# Patient Record
Sex: MALE | Race: WHITE | NOT HISPANIC OR LATINO | Employment: UNEMPLOYED | URBAN - METROPOLITAN AREA
[De-identification: names, ages, dates, MRNs, and addresses within clinical notes are randomized per-mention and may not be internally consistent; named-entity substitution may affect disease eponyms.]

---

## 2022-04-22 PROBLEM — I25.10 CAD (CORONARY ARTERY DISEASE), NATIVE CORONARY ARTERY: Status: ACTIVE | Noted: 2022-04-22

## 2022-10-27 NOTE — PROGRESS NOTES
Subjective   History of Present Illness: Sylvester Campos is a 52 y.o. male is being seen for consultation today at the request of Beto Caal MD. In the office today patient complains of right hand weakness and numbness. Patient also complains of pain in his right forearm.  Patient says this has been going on for about a year and a half.  He has noticed pain radiating into his right upper extremity and down to the hand.  He has noticed weakness in his hand as well as numbness of the first second and third digits.  He has also noticed some wasting of the muscles of the hand particularly over the thumb.  He has noticed worsening difficulty using his hand and dropping things.  Patient has no significant left-sided symptoms.  Patient has not undergone any conservative measures for this to this point.  He has undergone an EMG nerve conduction study but does not have the results with him here today.  Apparently he was told that the issue was emanating from his neck.          Previous Treatment: None    The following portions of the patient's history were reviewed and updated as appropriate: allergies, current medications, past family history, past medical history, past social history, past surgical history and problem list.    Review of Systems   Constitutional: Positive for activity change.   HENT: Positive for hearing loss.    Eyes: Negative.    Respiratory: Negative.    Cardiovascular: Negative.    Gastrointestinal: Negative.    Endocrine: Negative.    Genitourinary: Negative.    Musculoskeletal: Positive for neck pain (occasionally).   Skin: Negative.    Neurological: Positive for weakness (right hand) and numbness (right hand).   Hematological: Negative.    Psychiatric/Behavioral: Negative.        Objective     /89   Pulse 98   Wt 71.7 kg (158 lb)   SpO2 99%    There is no height or weight on file to calculate BMI.      Neurologic Exam     Mental Status   Oriented to person, place, and time.      Motor Exam     Strength   Strength 5/5 except as noted.  4 - on the right     Sensory Exam   Decreased sensation over C6 and 7 dermatomes on the right     Gait, Coordination, and Reflexes     Reflexes   Right patellar: 1+  Left patellar: 1+  Right Holden: absent  Left Holden: absent  Right ankle clonus: absent  Left pendular knee jerk: absent      Assessment & Plan   Independent Review of Radiographic Studies:      I personally reviewed and interpreted the images from the following studies.    MRI cervical spine: Multilevel degenerative changes most severe from the C3-7.  At C3-4 there is central disc herniation with some moderate stenosis and the disc herniation abuts the cord.  Smaller central disc herniation at C4-5.  At C5-6 there is there is central disc herniation resulting in moderate to severe central stenosis and severe right foraminal stenosis.  At C6-7 there is mild central stenosis and severe right foraminal stenosis.    Medical Decision Making:      Sylvester Campos is a 52 y.o. male with a history consistent with cervical radiculopathy as well as early myelopathy and central and foraminal stenosis on MRI that does correlate with the patient's C6 and 7 symptoms.  Patient has not tried any conservative measures, and we will send him for epidural steroid injection.  He will also send us his EMG nerve conduction study results.  I will see him back once he completes the injection.  We could consider ACDF for treatment, however he would require a C3-C7 ACDF to treat his central and foraminal stenosis.      Diagnoses and all orders for this visit:    1. Cervical radiculopathy (Primary)    2. Cervical myelopathy (HCC)    3. DDD (degenerative disc disease), cervical      No follow-ups on file.    This patient was examined wearing appropriate personal protective equipment.                      Dr. Hakeem Kirkland IV    10/31/22  10:57 EDT

## 2022-10-31 ENCOUNTER — OFFICE VISIT (OUTPATIENT)
Dept: NEUROSURGERY | Facility: CLINIC | Age: 52
End: 2022-10-31

## 2022-10-31 VITALS
WEIGHT: 158 LBS | DIASTOLIC BLOOD PRESSURE: 89 MMHG | OXYGEN SATURATION: 99 % | SYSTOLIC BLOOD PRESSURE: 171 MMHG | HEART RATE: 98 BPM

## 2022-10-31 DIAGNOSIS — M54.12 CERVICAL RADICULOPATHY: Primary | ICD-10-CM

## 2022-10-31 DIAGNOSIS — G95.9 CERVICAL MYELOPATHY: ICD-10-CM

## 2022-10-31 DIAGNOSIS — M50.30 DDD (DEGENERATIVE DISC DISEASE), CERVICAL: ICD-10-CM

## 2022-10-31 PROCEDURE — 99204 OFFICE O/P NEW MOD 45 MIN: CPT | Performed by: NEUROLOGICAL SURGERY

## 2022-11-22 ENCOUNTER — TELEPHONE (OUTPATIENT)
Dept: NEUROSURGERY | Facility: CLINIC | Age: 52
End: 2022-11-22

## 2022-11-22 NOTE — TELEPHONE ENCOUNTER
Called patient to ask him if he had been able to receive his injection that had been ordered and he said he had not and that he was under the impression that he was getting the injection here at this office on Monday 11/28/22 at his Follow up appointment. I then explained that [facility] a he said no one had contacted him about this. Barbra then called the [facility] and we discovered they had the wrong phone number and was not able to reach him. Barbra gave them the correct number and they will call and schedule him. I called patient back and told him he may want to call them to expedite things. I will Cancel patient for Monday 11/28/22 until he receives his injection.

## 2022-11-22 NOTE — TELEPHONE ENCOUNTER
I called and spoke with [facility] at [facility] to see if they have received paperwork I have faxed. I told Evon the patient told one of our Evon said she did receive on 10-31-22 and put pain management request in on 11-3-22. [facility] tried to reach out to the patient on 11-17-22 and the number didn't work. I provided Evon with an additional number we have.

## 2022-11-22 NOTE — TELEPHONE ENCOUNTER
PATIENT CALLED BACK - ATTEMPTED WT - NOT SUCCESSFUL - PER PATIENT HE NOW  HAS APPOINTMENT SCHEDULED AT [facility] FOR [facility]/22 AT 1PM.  THIS WILL BE HIS INITIAL CONSULT, AFTER HIS VISIT HIS INJECTION WILL BE SCHEDULED.  PLEASE CALL PATIENT WITH ANY QUESTIONS.

## 2025-06-23 ENCOUNTER — OFFICE VISIT (OUTPATIENT)
Dept: NEUROSURGERY | Facility: CLINIC | Age: 55
End: 2025-06-23
Payer: OTHER GOVERNMENT

## 2025-06-23 ENCOUNTER — PREP FOR SURGERY (OUTPATIENT)
Dept: OTHER | Facility: HOSPITAL | Age: 55
End: 2025-06-23
Payer: OTHER GOVERNMENT

## 2025-06-23 VITALS
DIASTOLIC BLOOD PRESSURE: 89 MMHG | BODY MASS INDEX: 25.11 KG/M2 | WEIGHT: 160 LBS | SYSTOLIC BLOOD PRESSURE: 134 MMHG | HEART RATE: 99 BPM | HEIGHT: 67 IN | OXYGEN SATURATION: 94 %

## 2025-06-23 DIAGNOSIS — M54.12 CERVICAL RADICULOPATHY: Primary | ICD-10-CM

## 2025-06-23 DIAGNOSIS — M50.30 DDD (DEGENERATIVE DISC DISEASE), CERVICAL: ICD-10-CM

## 2025-06-23 DIAGNOSIS — M48.02 CERVICAL STENOSIS OF SPINE: ICD-10-CM

## 2025-06-23 PROCEDURE — 99214 OFFICE O/P EST MOD 30 MIN: CPT | Performed by: NEUROLOGICAL SURGERY

## 2025-06-23 NOTE — PROGRESS NOTES
"Subjective   History of Present Illness: Sylvester Campos is a 55 y.o. male is here today for follow-up.  Patient was last seen about 3 years ago.  At that time he was having significant numbness and weakness in his right upper extremity with EMG confirmed C8 radiculopathy.  He now has developed atrophy of his right hand as well as numbness and paresthesias.  Patient denies any significant radiating pain.  No significant neck pain.  He underwent an injection shortly after seeing me initially but has not undergone any further injections.  He has not done any recent physical therapy      Chief Complaint   Patient presents with    Neck Pain          Previous treatment:   Greater than 6 weeks of physical therapy and Home exercise program    Previous neurosurgery:      Previous injections: Cervical epidural    The following portions of the patient's history were reviewed and updated as appropriate: allergies, current medications, past family history, past medical history, past social history, past surgical history, and problem list.    Review of Systems   Constitutional:  Positive for activity change.   HENT: Negative.     Eyes: Negative.    Respiratory: Negative.     Cardiovascular: Negative.    Gastrointestinal: Negative.    Endocrine: Negative.    Genitourinary: Negative.    Musculoskeletal: Negative.    Skin: Negative.    Allergic/Immunologic: Negative.    Neurological:  Positive for weakness (right arm/hand) and numbness (tingling/right arm/hand).   Hematological: Negative.    Psychiatric/Behavioral:  Positive for sleep disturbance.        Objective      /89 (BP Location: Left arm, Patient Position: Sitting)   Pulse 99   Ht 170.2 cm (67\")   Wt 72.6 kg (160 lb)   SpO2 94%   BMI 25.06 kg/m²    Body mass index is 25.06 kg/m².  Vitals:    06/23/25 1519   PainSc: 0-No pain         Neurological Exam  Mental Status  Awake, alert and oriented to person, place and time.    Motor   Strength is 5/5 in all four " extremities except as noted.  Right  weakness 3 out of 5 with atrophy of the intrinsic hand muscles.    Sensory  Decreased sensory loss over right upper extremity.    Reflexes    Right pathological reflexes: Jarvis's absent.  Left pathological reflexes: Jarvis's absent.          Assessment & Plan   Independent Review of Radiographic Studies:      I personally reviewed and interpreted the images from the following studies.    MRI cervical spine: Degenerative changes most severe from C5-T1.  At C5-6 there is moderate to severe central stenosis and bilateral foraminal stenosis right worse than left.  At C6-7 there is bilateral foraminal stenosis that is severe right worse than the left.  At C7-T1 there is severe foraminal stenosis right worse than left.    Medical Decision Making:      Sylvester Campos is a 55 y.o. male was last seen 3 years ago with significant radiculopathy and subsequently has developed worsening symptoms in muscle atrophy.  He has significant central and foraminal stenosis from C5-T1.  I discussed with him that I do not think he will recover any significant muscle function.  Surgery would be to prevent worsening function and also to open up the canal particularly at C5-6 given the central stenosis.  We will proceed with C5-T1 ACDF.  Will get CT scan of the cervical spine for preoperative evaluation of bony stenosis      Diagnoses and all orders for this visit:    1. Cervical radiculopathy (Primary)    2. DDD (degenerative disc disease), cervical    3. Cervical stenosis of spine      No follow-ups on file.    This patient was examined wearing appropriate personal protective equipment.                      Dr. Hakeem Kirkland IV    06/23/25  15:51 EDT

## 2025-07-08 DIAGNOSIS — Z98.1 S/P SPINAL FUSION: Primary | ICD-10-CM

## 2025-07-15 ENCOUNTER — LAB (OUTPATIENT)
Dept: LAB | Facility: HOSPITAL | Age: 55
End: 2025-07-15
Payer: OTHER GOVERNMENT

## 2025-07-15 ENCOUNTER — HOSPITAL ENCOUNTER (OUTPATIENT)
Dept: CARDIOLOGY | Facility: HOSPITAL | Age: 55
Discharge: HOME | End: 2025-07-15
Payer: OTHER GOVERNMENT

## 2025-07-15 DIAGNOSIS — M54.12 CERVICAL RADICULOPATHY: ICD-10-CM

## 2025-07-15 LAB
ABO GROUP BLD: NORMAL
ANION GAP SERPL CALCULATED.3IONS-SCNC: 13 MMOL/L (ref 5–15)
BASOPHILS # BLD AUTO: 0.08 10*3/MM3 (ref 0–0.2)
BASOPHILS NFR BLD AUTO: 1.2 % (ref 0–1.5)
BILIRUB UR QL STRIP: NEGATIVE
BLD GP AB SCN SERPL QL: NEGATIVE
BUN SERPL-MCNC: 10 MG/DL (ref 6–20)
BUN/CREAT SERPL: 9.7 (ref 7–25)
CALCIUM SPEC-SCNC: 9.1 MG/DL (ref 8.6–10.5)
CHLORIDE SERPL-SCNC: 104 MMOL/L (ref 98–107)
CLARITY UR: CLEAR
CO2 SERPL-SCNC: 27 MMOL/L (ref 22–29)
COLOR UR: YELLOW
CREAT SERPL-MCNC: 1.03 MG/DL (ref 0.76–1.27)
DEPRECATED RDW RBC AUTO: 41.4 FL (ref 37–54)
EGFRCR SERPLBLD CKD-EPI 2021: 86.3 ML/MIN/1.73
EOSINOPHIL # BLD AUTO: 0.18 10*3/MM3 (ref 0–0.4)
EOSINOPHIL NFR BLD AUTO: 2.7 % (ref 0.3–6.2)
ERYTHROCYTE [DISTWIDTH] IN BLOOD BY AUTOMATED COUNT: 13.1 % (ref 12.3–15.4)
GLUCOSE SERPL-MCNC: 131 MG/DL (ref 65–99)
GLUCOSE UR STRIP-MCNC: NEGATIVE MG/DL
HBA1C MFR BLD: 6.11 % (ref 4.8–5.6)
HCT VFR BLD AUTO: 47.5 % (ref 37.5–51)
HGB BLD-MCNC: 15.5 G/DL (ref 13–17.7)
HGB UR QL STRIP.AUTO: NEGATIVE
IMM GRANULOCYTES # BLD AUTO: 0.02 10*3/MM3 (ref 0–0.05)
IMM GRANULOCYTES NFR BLD AUTO: 0.3 % (ref 0–0.5)
INR PPP: 1 (ref 0.9–1.1)
KETONES UR QL STRIP: ABNORMAL
LEUKOCYTE ESTERASE UR QL STRIP.AUTO: NEGATIVE
LYMPHOCYTES # BLD AUTO: 2.54 10*3/MM3 (ref 0.7–3.1)
LYMPHOCYTES NFR BLD AUTO: 38.5 % (ref 19.6–45.3)
MCH RBC QN AUTO: 28.5 PG (ref 26.6–33)
MCHC RBC AUTO-ENTMCNC: 32.6 G/DL (ref 31.5–35.7)
MCV RBC AUTO: 87.3 FL (ref 79–97)
MONOCYTES # BLD AUTO: 0.41 10*3/MM3 (ref 0.1–0.9)
MONOCYTES NFR BLD AUTO: 6.2 % (ref 5–12)
MRSA DNA SPEC QL NAA+PROBE: NORMAL
NEUTROPHILS NFR BLD AUTO: 3.36 10*3/MM3 (ref 1.7–7)
NEUTROPHILS NFR BLD AUTO: 51.1 % (ref 42.7–76)
NITRITE UR QL STRIP: NEGATIVE
NRBC BLD AUTO-RTO: 0 /100 WBC (ref 0–0.2)
PH UR STRIP.AUTO: 5.5 [PH] (ref 5–8)
PLATELET # BLD AUTO: 237 10*3/MM3 (ref 140–450)
PMV BLD AUTO: 9.9 FL (ref 6–12)
POTASSIUM SERPL-SCNC: 3.5 MMOL/L (ref 3.5–5.2)
PROT UR QL STRIP: ABNORMAL
PROTHROMBIN TIME: 13.1 SECONDS (ref 11.7–14.2)
QT INTERVAL: 332 MS
QTC INTERVAL: 427 MS
RBC # BLD AUTO: 5.44 10*6/MM3 (ref 4.14–5.8)
RH BLD: POSITIVE
SODIUM SERPL-SCNC: 144 MMOL/L (ref 136–145)
SP GR UR STRIP: >1.03 (ref 1–1.03)
T&S EXPIRATION DATE: NORMAL
UROBILINOGEN UR QL STRIP: ABNORMAL
WBC NRBC COR # BLD AUTO: 6.59 10*3/MM3 (ref 3.4–10.8)

## 2025-07-15 PROCEDURE — 86850 RBC ANTIBODY SCREEN: CPT

## 2025-07-15 PROCEDURE — 86900 BLOOD TYPING SEROLOGIC ABO: CPT

## 2025-07-15 PROCEDURE — 86901 BLOOD TYPING SEROLOGIC RH(D): CPT

## 2025-07-15 PROCEDURE — 93005 ELECTROCARDIOGRAM TRACING: CPT | Performed by: NEUROLOGICAL SURGERY

## 2025-07-15 PROCEDURE — 85025 COMPLETE CBC W/AUTO DIFF WBC: CPT

## 2025-07-15 PROCEDURE — 85610 PROTHROMBIN TIME: CPT

## 2025-07-15 PROCEDURE — 81003 URINALYSIS AUTO W/O SCOPE: CPT

## 2025-07-15 PROCEDURE — 87641 MR-STAPH DNA AMP PROBE: CPT

## 2025-07-15 PROCEDURE — 80048 BASIC METABOLIC PNL TOTAL CA: CPT

## 2025-07-15 PROCEDURE — 36415 COLL VENOUS BLD VENIPUNCTURE: CPT

## 2025-07-15 PROCEDURE — 83036 HEMOGLOBIN GLYCOSYLATED A1C: CPT

## 2025-07-17 ENCOUNTER — TELEPHONE (OUTPATIENT)
Dept: NEUROSURGERY | Facility: CLINIC | Age: 55
End: 2025-07-17
Payer: OTHER GOVERNMENT

## 2025-07-17 NOTE — TELEPHONE ENCOUNTER
[facility] received a fax that requires your attention. The document has been indexed to the patient’s chart for your review.      Reason for sending: RECEIVED REQUEST FOR COMPLETED & SIGNED RFS WITH SUPPORTING MEDICAL DOCUMENTATION    Documents Description: REQUEST FOR COMPLETED & SIGNED RFS W SUPPORTING DOC_MIGUEL MORALES [facility]_07/16/25    Name of Sender: MIGUEL MORALES [facility] KARRIE ÁLVAREZ LPN    Date Indexed: 07/16/25    Notes (if needed): REGARDING UPDATED REF

## 2025-07-17 NOTE — TELEPHONE ENCOUNTER
I LEFT A VOICE MAIL FOR MR. BOOKER TO CALL BACK REGARDING HIS SURGERY DATE. I HAVE ALSO SENT THE SURGERY LETTER THROUGH MY CHART AND I WILL PLACE IT IN THE MAIL.

## 2025-07-18 ENCOUNTER — HOSPITAL ENCOUNTER (OUTPATIENT)
Dept: CT IMAGING | Facility: HOSPITAL | Age: 55
Discharge: HOME | End: 2025-07-18
Admitting: NEUROLOGICAL SURGERY
Payer: OTHER GOVERNMENT

## 2025-07-18 DIAGNOSIS — M54.12 CERVICAL RADICULOPATHY: ICD-10-CM

## 2025-07-18 PROCEDURE — 72125 CT NECK SPINE W/O DYE: CPT

## 2025-07-21 ENCOUNTER — TELEPHONE (OUTPATIENT)
Dept: CARDIOLOGY | Facility: CLINIC | Age: 55
End: 2025-07-21
Payer: OTHER GOVERNMENT

## 2025-07-21 NOTE — TELEPHONE ENCOUNTER
Caller: Sylvester Campos    Relationship to patient: Self    Best call back number: [phone]     Patient is needing: PT RETURNING GEOVANY'S CALL. WAS INFORMED SHE WAS AT DIFFERENT OFFICE, PLS GIVE THIS PT A CALLBACK AT EARLIEST CONVEYANCE. PT IS SCHEDULED FOR NECK SURGERY THIS THURS.

## 2025-07-21 NOTE — TELEPHONE ENCOUNTER
LMOM for patient to be scheduled on 8/1 with Dr. Ghosh in the evening per Dr. Ghosh for cardiac clearance.

## 2025-07-23 ENCOUNTER — TELEPHONE (OUTPATIENT)
Dept: NEUROSURGERY | Facility: CLINIC | Age: 55
End: 2025-07-23
Payer: OTHER GOVERNMENT

## 2025-07-23 NOTE — TELEPHONE ENCOUNTER
Called the patient because cardiology has been trying to reach him for an appointment before they will clear him. We also had to move his surgery out to next week due to OR equipment. Called the patient's daughter listed as his EC and there was no answer. Called his SO listed on his  Verbal Release and there was no answer with her either. His surgery will need to be cancelled if he does not get clearance first.

## 2025-07-24 NOTE — TELEPHONE ENCOUNTER
PT CALLED STATES HE WENT TO THE HOSPITAL THIS MORNING FOR HIS SURGERY, DID NOT KNOW IT WAS MOVED.     SO FOR CARDIO, PT STATES HE HAS NOT SEEN HIS CARDIO DR IN 4 YEARS, [facility] OFFICE TOLD HIM THEY WOULD TAKE CARE OF THE CLEARANCE.     [facility] ADVISED CARDIO WITH  [facility] IS TRYING TO SCHEDULE HIM. PER T/E FA IS 8-1-25 , [facility] DID ADVISE IF HE COULD NOT GET IN BEFORE 7-30 THEN WILL HAVE TO CHANGE SURGERY DATE.       [facility] TRIED TO WT [facility] NOT AVAILABLE.     PLEASE CALL PT TO DISCUSS/ADVISE    THANK YOU,

## 2025-07-25 ENCOUNTER — OFFICE VISIT (OUTPATIENT)
Dept: CARDIOLOGY | Facility: CLINIC | Age: 55
End: 2025-07-25
Payer: OTHER GOVERNMENT

## 2025-07-25 VITALS
HEIGHT: 67 IN | BODY MASS INDEX: 24.8 KG/M2 | HEART RATE: 102 BPM | OXYGEN SATURATION: 96 % | SYSTOLIC BLOOD PRESSURE: 144 MMHG | WEIGHT: 158 LBS | DIASTOLIC BLOOD PRESSURE: 88 MMHG

## 2025-07-25 DIAGNOSIS — I10 ESSENTIAL HYPERTENSION: Primary | ICD-10-CM

## 2025-07-25 DIAGNOSIS — Z98.61 CAD S/P PERCUTANEOUS CORONARY ANGIOPLASTY: ICD-10-CM

## 2025-07-25 DIAGNOSIS — I73.9 PVD (PERIPHERAL VASCULAR DISEASE) WITH CLAUDICATION: ICD-10-CM

## 2025-07-25 DIAGNOSIS — I25.10 CAD S/P PERCUTANEOUS CORONARY ANGIOPLASTY: ICD-10-CM

## 2025-07-25 RX ORDER — ATORVASTATIN CALCIUM 40 MG/1
40 TABLET, FILM COATED ORAL DAILY
Qty: 90 TABLET | Refills: 3 | Status: SHIPPED | OUTPATIENT
Start: 2025-07-25

## 2025-07-25 RX ORDER — ASPIRIN 81 MG/1
81 TABLET ORAL DAILY
Qty: 60 TABLET | Refills: 3 | Status: SHIPPED | OUTPATIENT
Start: 2025-07-25

## 2025-07-25 RX ORDER — LOSARTAN POTASSIUM 25 MG/1
25 TABLET ORAL DAILY
Qty: 30 TABLET | Refills: 3 | Status: SHIPPED | OUTPATIENT
Start: 2025-07-25

## 2025-07-25 NOTE — PATIENT INSTRUCTIONS
Thank you for following up with cardiology today.  We encourage you to continue taking her medications and engaging healthy lifestyle habits including physical activity and diet with low sodium levels.      We are starting losartan 25 mg daily, aspirin 81 mg, atorvastatin 40 mg daily.    We will obtain an echocardiogram and stress test to better evaluate your heart.  If you have any questions please let us know.

## 2025-07-25 NOTE — PROGRESS NOTES
Cardiology Office Consultation      Encounter Date:  2025    PATIENT IDENTIFICATION    Name: Sylvester Campos  Age: 55 y.o. Sex: male : 1970  MRN: [id number]    Reason For Consultation:  CAD    History of Present Illness:  Patient is a 55 y.o.  male  coming to cardiology office to establish care.    Patient has medical history of coronary disease status post PCI to LAD in  after episode of STEMI, hypertension, hyperlipidemia, PAD.    Following his PCI to proximal LAD  in the setting of STEMI, patient unfortunately lost follow-up with cardiology and comes today to reestablish care and for preoperative evaluation.    He has been followed by neurosurgery, found to have central and foraminal stenosis from C5-T1 and plans to have cervical radiculopathy surgery in near future.   During today's visit, patient reports claudication in his right leg, reports his not being able to walk  further than 100 yards without having to stop and rest.  Denies any recent episodes of chest pain or shortness of breath.  Unfortunately patient smokes close to 1 pack of cigarettes a day over the past 42 years.     Patient has not been taking statin or antihypertensive medications recently.  Blood pressure in the office today is elevated, 144/88.    No recent labs available for review as [facility]    Prior [facility]ology workup.    [facility]         Assessment & Plan    Impressions:  CAD s/p PCI to LAD in  in the setting of anterolateral STEMI  Residual disease in circumflex and RCA  Ischemic cardiomyopathy  Essential hypertension, not well-controlled  Hyperlipidemia  Peripheral artery disease with claudication  Preoperative evaluation for cervical radiculopathy    Recommendations:  Plan to check nuclear stress test as ischemia workup.  Check TTE for evaluation of ischemic cardiomyopathy and LVEF.  Resumed atorvastatin 40 mg daily.  Start aspirin 81 mg daily.  Blood pressure elevated in the  "office today, start losartan 25 mg daily.  Check arterial duplex right lower extremity and referred for vascular evaluation.  Depending on nuclear stress and TTE results we will determine next steps and provide preoperative evaluation.      Objective:    Vitals:  Vitals:    07/25/25 1102   BP: 144/88   Pulse: 102   SpO2: 96%   Weight: 71.7 kg (158 lb)   Height: 170.2 cm (67\")     Body mass index is 24.75 kg/m².    Physical Exam:  General: Alert, cooperative, no distress, appears stated age  Lungs:  Clear to auscultation bilaterally, no wheezes, rhonchi or rales are noted  Chest wall: No tenderness  Heart::  Regular rate and rhythm, S1 and S2 normal, no murmur.  No rub or gallop  Abdomen: Soft, nontender, nondistended, bowel sounds active  Extremities: No cyanosis, clubbing, or edema  Neuro/psych: No gross focal deficits        Allergies:  Allergies   Allergen Reactions    Shellfish-Derived Products Hives    Poison Ivy Extract Rash       Medication Review:     Current Outpatient Medications:     aspirin 81 MG EC tablet, Take 1 tablet by mouth Daily., Disp: , Rfl:     atorvastatin (LIPITOR) 40 MG tablet, Take 40 mg by mouth Daily. (Patient not taking: Reported on 7/25/2025), Disp: , Rfl:     carvedilol (COREG) 3.125 MG tablet, Take 3.125 mg by mouth 2 (Two) Times a Day With Meals. (Patient not taking: Reported on 7/25/2025), Disp: , Rfl:     lisinopril (PRINIVIL,ZESTRIL) 20 MG tablet, Take 20 mg by mouth Daily. (Patient not taking: Reported on 7/25/2025), Disp: , Rfl:     Family History:  History reviewed. No pertinent family history.    Past Medical History:  Past Medical History:   Diagnosis Date    Acute heart failure 02/06/2022    Cervical disc disorder     Coronary artery disease     PCI to LAD with overlapping drug eluting stent    Hyperlipidemia     Hypertension     Myocardial infarction        Past surgical History:  Past Surgical History:   Procedure Laterality Date    CARDIAC CATHETERIZATION  02/04/2022    " "Dr Connie Hamilton [facility]    CAROTID STENT  2/5/2021    CLAVICLE SURGERY      CORONARY STENT PLACEMENT      EPIDURAL BLOCK  04/2025       Social History:  Social History     Socioeconomic History    Marital status: Single   Tobacco Use    Smoking status: Every Day     Current packs/day: 1.00     Types: Cigarettes    Smokeless tobacco: Never    Tobacco comments:     40 yr smoker   Vaping Use    Vaping status: Never Used   Substance and Sexual Activity    Alcohol use: Not Currently     Comment: socially    Drug use: Not Currently     Types: Marijuana, Methamphetamines     Comment: sometimes    Sexual activity: Yes     Partners: Female       Review of Systems:  The following systems were reviewed as they relate to the cardiovascular system: Constitutional, Eyes, ENT, Cardiovascular, Respiratory, Gastrointestinal, Integumentary, Neurological, Psychiatric, Hematologic, Endocrine, Musculoskeletal, and Genitourinary. The pertinent cardiovascular findings are reported above with all other cardiovascular points within those systems being negative.    Diagnostic Study Review:         Laboratory Data:  Lab Results   Component Value Date    GLUCOSE 131 (H) 07/15/2025    BUN 10.0 07/15/2025    CREATININE 1.03 07/15/2025    BCR 9.7 07/15/2025    K 3.5 07/15/2025    CO2 27.0 07/15/2025    CALCIUM 9.1 07/15/2025     Lab Results   Component Value Date    GLUCOSE 131 (H) 07/15/2025    CALCIUM 9.1 07/15/2025     07/15/2025    K 3.5 07/15/2025    CO2 27.0 07/15/2025     07/15/2025    BUN 10.0 07/15/2025    CREATININE 1.03 07/15/2025    BCR 9.7 07/15/2025    ANIONGAP 13.0 07/15/2025     Lab Results   Component Value Date    WBC 6.59 07/15/2025    HGB 15.5 07/15/2025    HCT 47.5 07/15/2025    MCV 87.3 07/15/2025     07/15/2025     No results found for: \"CHOL\", \"CHLPL\", \"TRIG\", \"HDL\", \"LDL\", \"LDLDIRECT\"  Lab Results   Component Value Date    HGBA1C 6.11 (H) 07/15/2025     Lab Results   Component Value Date    INR 1.00 " 07/15/2025    PROTIME 13.1 07/15/2025       Most Recent Echo:       Most Recent Stress Test:       Most Recent Cardiac Catheterization:   No results found for this or any previous visit.       NOTE: The following portions of the patient's note were reviewed, confirmed and/or updated this visit as appropriate: History of present illness/Interval history, physical examination, assessment & plan, allergies, current medications, past family history, past medical history, past social history, past surgical history and problem list.

## 2025-07-28 ENCOUNTER — TELEPHONE (OUTPATIENT)
Dept: NEUROSURGERY | Facility: CLINIC | Age: 55
End: 2025-07-28
Payer: OTHER GOVERNMENT

## 2025-07-28 NOTE — TELEPHONE ENCOUNTER
I spoke with patient regarding his surgery. The patient still needs further testing from his cardiologist and I told him to give us a call once this was completed and we can get him scheduled.

## 2025-07-30 DIAGNOSIS — I73.9 CLAUDICATION OF RIGHT LOWER EXTREMITY: Primary | ICD-10-CM

## 2025-07-31 DIAGNOSIS — I73.9 PVD (PERIPHERAL VASCULAR DISEASE) WITH CLAUDICATION: Primary | ICD-10-CM

## 2025-08-14 ENCOUNTER — TELEPHONE (OUTPATIENT)
Dept: NEUROSURGERY | Facility: CLINIC | Age: 55
End: 2025-08-14
Payer: OTHER GOVERNMENT